# Patient Record
Sex: FEMALE | Race: OTHER | Employment: UNEMPLOYED | URBAN - METROPOLITAN AREA
[De-identification: names, ages, dates, MRNs, and addresses within clinical notes are randomized per-mention and may not be internally consistent; named-entity substitution may affect disease eponyms.]

---

## 2022-03-28 ENCOUNTER — HOSPITAL ENCOUNTER (EMERGENCY)
Age: 27
Discharge: HOME | End: 2022-03-28
Attending: EMERGENCY MEDICINE

## 2022-03-28 VITALS
DIASTOLIC BLOOD PRESSURE: 56 MMHG | RESPIRATION RATE: 16 BRPM | HEART RATE: 66 BPM | OXYGEN SATURATION: 100 % | TEMPERATURE: 98.3 F | SYSTOLIC BLOOD PRESSURE: 108 MMHG

## 2022-03-28 DIAGNOSIS — M54.32 SCIATICA OF LEFT SIDE: Primary | ICD-10-CM

## 2022-03-28 LAB
BILIRUBIN URINE: NEGATIVE
BLOOD, URINE: NEGATIVE
CLARITY: CLEAR
COLOR: YELLOW
GLUCOSE URINE: NEGATIVE MG/DL
HCG(URINE) PREGNANCY TEST: NEGATIVE
KETONES, URINE: NEGATIVE MG/DL
LEUKOCYTE ESTERASE, URINE: NEGATIVE
MICROSCOPIC EXAMINATION: ABNORMAL
NITRITE, URINE: NEGATIVE
PH UA: 8.5 (ref 5–8)
PROTEIN UA: NEGATIVE MG/DL
SPECIFIC GRAVITY UA: 1.02 (ref 1–1.03)
URINE REFLEX TO CULTURE: ABNORMAL
URINE TYPE: ABNORMAL
UROBILINOGEN, URINE: 0.2 E.U./DL

## 2022-03-28 PROCEDURE — 96372 THER/PROPH/DIAG INJ SC/IM: CPT

## 2022-03-28 PROCEDURE — 6360000002 HC RX W HCPCS: Performed by: EMERGENCY MEDICINE

## 2022-03-28 PROCEDURE — 81003 URINALYSIS AUTO W/O SCOPE: CPT

## 2022-03-28 PROCEDURE — 6370000000 HC RX 637 (ALT 250 FOR IP): Performed by: EMERGENCY MEDICINE

## 2022-03-28 PROCEDURE — 99283 EMERGENCY DEPT VISIT LOW MDM: CPT

## 2022-03-28 PROCEDURE — 84703 CHORIONIC GONADOTROPIN ASSAY: CPT

## 2022-03-28 RX ORDER — KETOROLAC TROMETHAMINE 30 MG/ML
15 INJECTION, SOLUTION INTRAMUSCULAR; INTRAVENOUS ONCE
Status: COMPLETED | OUTPATIENT
Start: 2022-03-28 | End: 2022-03-28

## 2022-03-28 RX ORDER — METHOCARBAMOL 750 MG/1
750-1500 TABLET, FILM COATED ORAL 3 TIMES DAILY
Qty: 15 TABLET | Refills: 0 | Status: SHIPPED | OUTPATIENT
Start: 2022-03-28 | End: 2022-04-02

## 2022-03-28 RX ORDER — PREDNISONE 20 MG/1
40 TABLET ORAL DAILY
Qty: 8 TABLET | Refills: 0 | Status: SHIPPED | OUTPATIENT
Start: 2022-03-28 | End: 2022-04-01

## 2022-03-28 RX ORDER — PREDNISONE 20 MG/1
60 TABLET ORAL ONCE
Status: COMPLETED | OUTPATIENT
Start: 2022-03-28 | End: 2022-03-28

## 2022-03-28 RX ADMIN — KETOROLAC TROMETHAMINE 15 MG: 30 INJECTION, SOLUTION INTRAMUSCULAR; INTRAVENOUS at 20:31

## 2022-03-28 RX ADMIN — PREDNISONE 60 MG: 20 TABLET ORAL at 20:31

## 2022-03-28 ASSESSMENT — PAIN - FUNCTIONAL ASSESSMENT: PAIN_FUNCTIONAL_ASSESSMENT: 0-10

## 2022-03-28 ASSESSMENT — PAIN SCALES - GENERAL
PAINLEVEL_OUTOF10: 8
PAINLEVEL_OUTOF10: 8

## 2022-03-29 NOTE — ED PROVIDER NOTES
[facility] Emergency Department      Pt Name: Dex Salgado  MRN: [id number]  [place] 1995  Date of evaluation: 3/28/2022  Provider: Laura Montes De Oca MD  CHIEF COMPLAINT  Chief Complaint   Patient presents with    Back Pain     pt triaged using  [id number], back pain radiating to front and down leg since friday     HPI  Dex Salgado is a 32 y.o. female who presents because of back pain. Pain started 3 days ago. She denies any known injury, but does lift her 3year-old child often. Pain is worse if she moves certain ways. It radiates from her left buttock area down her left leg. She denies any numbness or weakness. Denies any dysuria or frequency. Does have some pain in the left pelvic area and says that she chronically gets pain across her lower abdomen. Denies any abnormality of menstrual periods or vaginal discharge. Any fever or chills. No history of any drug use. REVIEW OF SYSTEMS:  No fever, no incontinence, no focal weakness, no dysuria Pertinent positives and negatives as per the HPI. All other review of systems reviewed and negative. Nursing notes reviewed. PAST MEDICAL HISTORY  History reviewed. No pertinent past medical history. SURGICAL HISTORY  History reviewed. No pertinent surgical history. MEDICATIONS:  No current facility-administered medications on file prior to encounter. No current outpatient medications on file prior to encounter. ALLERGIES  Patient has no known allergies. FAMILY HISTORY:  History reviewed. No pertinent family history. SOCIAL HISTORY:    Social History     Tobacco Use    Smoking status: Never Smoker    Smokeless tobacco: Never Used   Substance Use Topics    Alcohol use: Never    Drug use: Never     IMMUNIZATIONS:  Noncontributory    PHYSICAL EXAM  VITAL SIGNS:  Blood pressure (!) 108/56, pulse 66, temperature 98.3 °F (36.8 °C), resp. rate 16, SpO2 100 %.   Constitutional:  32 y.o. female who does not appear toxic  HENT:  Atraumatic, mucous membranes moist  Eyes:   Conjunctiva clear, no icterus  Neck:  Supple, no visible JVD, no ML tenderness  Cardiovascular:  Regular, no rubs, no discernible murmur  Thorax & Lungs:  No accessory muscle usage, clear  Abdomen:  Soft, non distended, bowel sounds present, nontender  Back:  No deformity, no bruising, no CVA tenderness  Genitalia:  Deferred  Rectal:  Deferred  Extremities:  No cyanosis, no edema, pedal pulses symmetric  Skin:  Warm, dry  Neurologic:  Alert, no slurred speech, no focal deficits, strength normal and symmetric, LT sensation intact, left straight leg raise positive, gait is normal   Psychiatric:  Affect appropriate    DIAGNOSTIC RESULTS:  Labs Reviewed   URINALYSIS WITH REFLEX TO CULTURE - Abnormal; Notable for the following components:       Result Value    pH, UA 8.5 (*)     All other components within normal limits   PREGNANCY, URINE     RADIOLOGY:  None     ED COURSE:    Medications administered:  Medications   ketorolac (TORADOL) injection 15 mg (15 mg IntraMUSCular Given 3/28/22 2031)   predniSONE (DELTASONE) tablet 60 mg (60 mg Oral Given 3/28/22 2031)     PROCEDURES:  None    CRITICAL CARE:  None    CONSULTATIONS:  None    MEDICAL DECISION MAKING: Abhilash Zhu is a 32 y.o. female who presented because of back pain radiating to her left leg. I feel the patient's history and evaluation suggests a musculoskeletal source for pain such as muscles, tendons, nerve irritation. I do not believe the patient is experiencing symptoms from epidural abscess, pyelonephritis, pancreatitis, vascular dissection, transverse myelitis, cauda equina syndrome, discitis, ACS, gonad torsion, amongst other emergencies. There are no clinical findings to suggest of DVT, vascular occlusion, compartment syndrome, infectious process, etc.   Abhilash Zhu was given appropriate discharge instructions. Referral to follow up provider.     New Prescriptions METHOCARBAMOL (ROBAXIN-750) 750 MG TABLET    Take 1-2 tablets by mouth 3 times daily for 15 doses    PREDNISONE (DELTASONE) 20 MG TABLET    Take 2 tablets by mouth daily for 4 days     FOLLOW UP:    [facility]) Pre-Services  [phone]  Schedule an appointment as soon as possible for a visit       FINAL IMPRESSION:    1. Sciatica of left side      (Please note that I used voice recognition software to generate this note.   Occasionally words are mistranscribed despite my efforts to edit errors.)       Martha Marti MD  03/28/22 0153